# Patient Record
Sex: MALE | Race: WHITE | Employment: FULL TIME | URBAN - METROPOLITAN AREA
[De-identification: names, ages, dates, MRNs, and addresses within clinical notes are randomized per-mention and may not be internally consistent; named-entity substitution may affect disease eponyms.]

---

## 2017-05-04 RX ORDER — LOVASTATIN 40 MG/1
TABLET ORAL
Qty: 90 TABLET | Refills: 1 | Status: SHIPPED | OUTPATIENT
Start: 2017-05-04 | End: 2017-11-17

## 2017-09-15 ENCOUNTER — TELEPHONE (OUTPATIENT)
Dept: INTERNAL MEDICINE CLINIC | Facility: CLINIC | Age: 72
End: 2017-09-15

## 2017-09-15 DIAGNOSIS — Z13.0 SCREENING FOR DISORDER OF BLOOD AND BLOOD-FORMING ORGANS: ICD-10-CM

## 2017-09-15 DIAGNOSIS — Z13.228 SCREENING FOR METABOLIC DISORDER: ICD-10-CM

## 2017-09-15 DIAGNOSIS — Z13.29 SCREENING FOR THYROID DISORDER: ICD-10-CM

## 2017-09-15 DIAGNOSIS — Z00.00 ROUTINE GENERAL MEDICAL EXAMINATION AT A HEALTH CARE FACILITY: Primary | ICD-10-CM

## 2017-09-15 DIAGNOSIS — Z12.5 SCREENING FOR MALIGNANT NEOPLASM OF PROSTATE: ICD-10-CM

## 2017-09-15 DIAGNOSIS — Z13.220 SCREENING FOR LIPOID DISORDERS: ICD-10-CM

## 2017-09-15 NOTE — TELEPHONE ENCOUNTER
cpe Future Appointments  Date Time Provider Yulisa Barber   11/17/2017 9:00 AM Irene Alpers, MD [facility]     Or[facility] to [facility]  [facility] must fast no call back required

## 2017-11-17 ENCOUNTER — OFFICE VISIT (OUTPATIENT)
Dept: INTERNAL MEDICINE CLINIC | Facility: CLINIC | Age: 72
End: 2017-11-17

## 2017-11-17 VITALS
SYSTOLIC BLOOD PRESSURE: 134 MMHG | DIASTOLIC BLOOD PRESSURE: 60 MMHG | BODY MASS INDEX: 29.25 KG/M2 | TEMPERATURE: 98 F | WEIGHT: 193 LBS | HEIGHT: 68 IN | HEART RATE: 104 BPM

## 2017-11-17 DIAGNOSIS — K21.9 GASTROESOPHAGEAL REFLUX DISEASE, ESOPHAGITIS PRESENCE NOT SPECIFIED: ICD-10-CM

## 2017-11-17 DIAGNOSIS — Z00.00 ROUTINE GENERAL MEDICAL EXAMINATION AT A HEALTH CARE FACILITY: Primary | ICD-10-CM

## 2017-11-17 DIAGNOSIS — R94.31 ABNORMAL EKG: ICD-10-CM

## 2017-11-17 DIAGNOSIS — E78.5 DYSLIPIDEMIA: ICD-10-CM

## 2017-11-17 PROCEDURE — 99397 PER PM REEVAL EST PAT 65+ YR: CPT | Performed by: INTERNAL MEDICINE

## 2017-11-17 PROCEDURE — 93000 ELECTROCARDIOGRAM COMPLETE: CPT | Performed by: INTERNAL MEDICINE

## 2017-11-17 RX ORDER — LOVASTATIN 40 MG/1
40 TABLET ORAL NIGHTLY
COMMUNITY
End: 2017-11-17

## 2017-11-17 RX ORDER — LOVASTATIN 40 MG/1
40 TABLET ORAL NIGHTLY
Qty: 90 TABLET | Refills: 3 | Status: SHIPPED | OUTPATIENT
Start: 2017-11-17 | End: 2018-11-19

## 2017-11-17 NOTE — PROGRESS NOTES
Patient presents with:  Physical: Room 3.        HPI:    Patient here for cpe  Dyslipidemia- controlled, on lovastatin, no SE reported  Diet: watches  Exercise:  active at work  Work: [employer] full time still  cscope UTD, next due 2019, polyps found  Marissa Bullock hemorrhoids 2005   • Joint pain    • Ocular motility disorder    • Other and unspecified hyperlipidemia 6/29/2012   • Other vitreous opacities 2004   • Rash 2009   • Reflux 2001    Symptoms   • Routine general medical examination at a health care facility normal.  Nose normal. Oropharynx is clear and moist.   Eyes: Conjunctivae and EOM are normal. PERRLA. No scleral icterus. Neck: Normal range of motion. Neck supple. Normal carotid pulses and no JVD present. No edema present.  No mass and no thyromegaly pr

## 2017-12-04 ENCOUNTER — HOSPITAL ENCOUNTER (OUTPATIENT)
Dept: CV DIAGNOSTICS | Facility: HOSPITAL | Age: 72
Discharge: HOME | End: 2017-12-04
Attending: INTERNAL MEDICINE
Payer: COMMERCIAL

## 2017-12-04 DIAGNOSIS — R94.31 ABNORMAL EKG: ICD-10-CM

## 2017-12-04 DIAGNOSIS — E78.5 DYSLIPIDEMIA: ICD-10-CM

## 2017-12-04 PROCEDURE — 93350 STRESS TTE ONLY: CPT | Performed by: INTERNAL MEDICINE

## 2017-12-04 PROCEDURE — 93018 CV STRESS TEST I&R ONLY: CPT | Performed by: INTERNAL MEDICINE

## 2017-12-04 PROCEDURE — 93017 CV STRESS TEST TRACING ONLY: CPT | Performed by: INTERNAL MEDICINE

## 2017-12-08 RX ORDER — LOVASTATIN 40 MG/1
TABLET ORAL
Qty: 90 TABLET | Refills: 1 | Status: SHIPPED | OUTPATIENT
Start: 2017-12-08 | End: 2018-09-04

## 2018-09-04 RX ORDER — LOVASTATIN 40 MG/1
TABLET ORAL
Qty: 90 TABLET | Refills: 1 | Status: SHIPPED | OUTPATIENT
Start: 2018-09-04 | End: 2018-11-19

## 2018-09-05 ENCOUNTER — TELEPHONE (OUTPATIENT)
Dept: INTERNAL MEDICINE CLINIC | Facility: CLINIC | Age: 73
End: 2018-09-05

## 2018-09-05 DIAGNOSIS — Z12.5 SCREENING FOR MALIGNANT NEOPLASM OF PROSTATE: ICD-10-CM

## 2018-09-05 DIAGNOSIS — Z13.29 SCREENING FOR THYROID DISORDER: ICD-10-CM

## 2018-09-05 DIAGNOSIS — Z13.228 SCREENING FOR METABOLIC DISORDER: ICD-10-CM

## 2018-09-05 DIAGNOSIS — E78.5 DYSLIPIDEMIA: Primary | ICD-10-CM

## 2018-09-05 DIAGNOSIS — Z13.0 SCREENING FOR DISORDER OF BLOOD AND BLOOD-FORMING ORGANS: ICD-10-CM

## 2018-09-05 DIAGNOSIS — Z00.00 ROUTINE GENERAL MEDICAL EXAMINATION AT A HEALTH CARE FACILITY: ICD-10-CM

## 2018-09-05 NOTE — TELEPHONE ENCOUNTER
Lab orders for CPE Future Appointments  Date Time Provider Yulisa Barber   11/19/2018 9:30 AM Lashell Thompson MD [facility]     Pref[facility] lab is [facility].  Pt aware to fast

## 2018-09-05 NOTE — TELEPHONE ENCOUNTER
Pt scheduled CPE on [facility] with this message    Appointment For: Ivan Gary ([id number])   Visit Type: [address] [place], [place] [id number] 6 ([id number])      11/19/2018   9:30 AM  30 mins. Kan Vazquez MD        [facility] [address]      Patient Comments:   Annual Physical,

## 2018-09-05 NOTE — TELEPHONE ENCOUNTER
Patient informed continue taking medications as prescribed until OV. Routed to [facility] home for lab orders.

## 2018-09-14 ENCOUNTER — MED REC SCAN ONLY (OUTPATIENT)
Dept: INTERNAL MEDICINE CLINIC | Facility: CLINIC | Age: 73
End: 2018-09-14

## 2018-11-19 ENCOUNTER — OFFICE VISIT (OUTPATIENT)
Dept: INTERNAL MEDICINE CLINIC | Facility: CLINIC | Age: 73
End: 2018-11-19
Payer: COMMERCIAL

## 2018-11-19 VITALS
WEIGHT: 193.63 LBS | HEART RATE: 88 BPM | DIASTOLIC BLOOD PRESSURE: 72 MMHG | HEIGHT: 68 IN | BODY MASS INDEX: 29.35 KG/M2 | RESPIRATION RATE: 16 BRPM | SYSTOLIC BLOOD PRESSURE: 138 MMHG

## 2018-11-19 DIAGNOSIS — R94.6 BORDERLINE ABNORMAL TFTS: ICD-10-CM

## 2018-11-19 DIAGNOSIS — N40.0 BENIGN PROSTATIC HYPERPLASIA WITHOUT LOWER URINARY TRACT SYMPTOMS: ICD-10-CM

## 2018-11-19 DIAGNOSIS — E78.5 DYSLIPIDEMIA: ICD-10-CM

## 2018-11-19 DIAGNOSIS — Z00.00 ROUTINE GENERAL MEDICAL EXAMINATION AT A HEALTH CARE FACILITY: Primary | ICD-10-CM

## 2018-11-19 PROCEDURE — 99397 PER PM REEVAL EST PAT 65+ YR: CPT | Performed by: INTERNAL MEDICINE

## 2018-11-19 RX ORDER — LOVASTATIN 40 MG/1
40 TABLET ORAL NIGHTLY
Qty: 90 TABLET | Refills: 3 | Status: SHIPPED | OUTPATIENT
Start: 2018-11-19 | End: 2019-11-20

## 2018-11-19 NOTE — PROGRESS NOTES
Patient presents with:  Physical: TN Exam Room 3: CPE, labs completed with abnormal Free T4  Elbow Pain: Left elbow pain      HPI:    Patient here for cpe  Dyslipidemia- controlled on lovastatin, no SE reported  Work: [employer] full time still, very activ 2005   • Joint pain    • Ocular motility disorder    • Other and unspecified hyperlipidemia 6/29/2012   • Other vitreous opacities 2004   • Rash 2009   • Reflux 2001    Symptoms   • Routine general medical examination at a health care facility 2004   • [facility] atraumatic. Hearing and tympanic membranes normal.  Nose normal. Oropharynx is clear and moist.   Eyes: Conjunctivae and EOM are normal. PERRLA. No scleral icterus. Neck: Normal range of motion. Neck supple. Normal carotid pulses and no JVD present.  No e understands the plan.

## 2019-02-14 ENCOUNTER — TELEPHONE (OUTPATIENT)
Dept: INTERNAL MEDICINE CLINIC | Facility: CLINIC | Age: 74
End: 2019-02-14

## 2019-02-14 NOTE — TELEPHONE ENCOUNTER
Patient has orders for a TSH and T4 in for Miranda Teran. Please change order to [facility].  Patient is going to [facility].

## 2019-02-16 LAB
T4, FREE: 1.2 NG/DL (ref 0.8–1.8)
TSH: 3.13 MIU/L (ref 0.4–4.5)

## 2019-03-14 ENCOUNTER — OFFICE VISIT (OUTPATIENT)
Dept: INTERNAL MEDICINE CLINIC | Facility: CLINIC | Age: 74
End: 2019-03-14
Payer: COMMERCIAL

## 2019-03-14 VITALS
SYSTOLIC BLOOD PRESSURE: 140 MMHG | HEIGHT: 68 IN | BODY MASS INDEX: 28.64 KG/M2 | TEMPERATURE: 98 F | DIASTOLIC BLOOD PRESSURE: 70 MMHG | HEART RATE: 92 BPM | WEIGHT: 189 LBS

## 2019-03-14 DIAGNOSIS — R11.2 NON-INTRACTABLE VOMITING WITH NAUSEA, UNSPECIFIED VOMITING TYPE: ICD-10-CM

## 2019-03-14 DIAGNOSIS — A09 DIARRHEA OF INFECTIOUS ORIGIN: Primary | ICD-10-CM

## 2019-03-14 PROCEDURE — 99213 OFFICE O/P EST LOW 20 MIN: CPT | Performed by: INTERNAL MEDICINE

## 2019-03-14 NOTE — PROGRESS NOTES
Samantha Murphy is a 72year old male. Patient presents with:  Diarrhea: LG. Room 3. Diarrhea that started Monday. HPI:     C/o fatigue,  diarrhea, nausea and little bit of vomiting that started 4 days ago.  He was feeling LH while working at [employer] per week      Frequency: 2-3 times a week      Drinks per session: 1 or 2      Binge frequency: Never      Comment: Cage done 3-14-19    Drug use: No    Family History   Problem Relation Age of Onset   • Cancer Father         colon   • Colon Cancer Father

## 2019-03-20 PROBLEM — D12.5 BENIGN NEOPLASM OF SIGMOID COLON: Status: ACTIVE | Noted: 2019-03-20

## 2019-03-20 PROBLEM — Z86.0100 PERSONAL HISTORY OF COLONIC POLYPS: Status: ACTIVE | Noted: 2019-03-20

## 2019-03-20 PROBLEM — Z86.010 PERSONAL HISTORY OF COLONIC POLYPS: Status: ACTIVE | Noted: 2019-03-20

## 2019-03-20 PROBLEM — D12.3 BENIGN NEOPLASM OF TRANSVERSE COLON: Status: ACTIVE | Noted: 2019-03-20

## 2019-03-20 PROBLEM — Z80.0 FAMILY HISTORY OF MALIGNANT NEOPLASM OF DIGESTIVE ORGAN: Status: ACTIVE | Noted: 2019-03-20

## 2019-09-11 ENCOUNTER — TELEPHONE (OUTPATIENT)
Dept: INTERNAL MEDICINE CLINIC | Facility: CLINIC | Age: 74
End: 2019-09-11

## 2019-09-11 DIAGNOSIS — Z12.5 SCREENING FOR PROSTATE CANCER: ICD-10-CM

## 2019-09-11 DIAGNOSIS — Z00.00 ROUTINE GENERAL MEDICAL EXAMINATION AT A HEALTH CARE FACILITY: Primary | ICD-10-CM

## 2019-09-11 DIAGNOSIS — Z13.228 SCREENING FOR ENDOCRINE, METABOLIC AND IMMUNITY DISORDER: ICD-10-CM

## 2019-09-11 DIAGNOSIS — Z13.0 SCREENING FOR DISORDER OF BLOOD AND BLOOD-FORMING ORGANS: ICD-10-CM

## 2019-09-11 DIAGNOSIS — Z13.220 SCREENING FOR LIPID DISORDERS: ICD-10-CM

## 2019-09-11 DIAGNOSIS — Z13.29 SCREENING FOR THYROID DISORDER: ICD-10-CM

## 2019-09-11 DIAGNOSIS — Z13.29 SCREENING FOR ENDOCRINE, METABOLIC AND IMMUNITY DISORDER: ICD-10-CM

## 2019-09-11 DIAGNOSIS — Z13.0 SCREENING FOR ENDOCRINE, METABOLIC AND IMMUNITY DISORDER: ICD-10-CM

## 2019-09-11 NOTE — TELEPHONE ENCOUNTER
Future Appointments   Date Time Provider Yulisa Barber   11/20/2019  9:00 AM Marco Carlton MD [facility]     Patient is scheduled for Annual Physical.  Please place orders with [facility].  Patient will be fasting.

## 2019-11-04 RX ORDER — LOVASTATIN 40 MG/1
TABLET ORAL
Qty: 90 TABLET | Refills: 0 | Status: SHIPPED | OUTPATIENT
Start: 2019-11-04 | End: 2019-11-20

## 2019-11-05 NOTE — TELEPHONE ENCOUNTER
Future Appointments   Date Time Provider Yulisa Elisabeth   11/20/2019  9:00 AM Odilon Metz MD [facility]

## 2019-11-20 ENCOUNTER — OFFICE VISIT (OUTPATIENT)
Dept: INTERNAL MEDICINE CLINIC | Facility: CLINIC | Age: 74
End: 2019-11-20
Payer: COMMERCIAL

## 2019-11-20 VITALS
HEIGHT: 67.52 IN | DIASTOLIC BLOOD PRESSURE: 68 MMHG | SYSTOLIC BLOOD PRESSURE: 122 MMHG | RESPIRATION RATE: 16 BRPM | BODY MASS INDEX: 29.59 KG/M2 | TEMPERATURE: 98 F | WEIGHT: 193 LBS | HEART RATE: 96 BPM

## 2019-11-20 DIAGNOSIS — Z00.00 ROUTINE GENERAL MEDICAL EXAMINATION AT A HEALTH CARE FACILITY: Primary | ICD-10-CM

## 2019-11-20 DIAGNOSIS — R04.0 FREQUENT NOSEBLEEDS: ICD-10-CM

## 2019-11-20 DIAGNOSIS — E78.5 DYSLIPIDEMIA: ICD-10-CM

## 2019-11-20 DIAGNOSIS — Z86.010 PERSONAL HISTORY OF COLONIC POLYPS: ICD-10-CM

## 2019-11-20 PROCEDURE — 93000 ELECTROCARDIOGRAM COMPLETE: CPT | Performed by: INTERNAL MEDICINE

## 2019-11-20 PROCEDURE — 99397 PER PM REEVAL EST PAT 65+ YR: CPT | Performed by: INTERNAL MEDICINE

## 2019-11-20 RX ORDER — LOVASTATIN 40 MG/1
40 TABLET ORAL NIGHTLY
Qty: 90 TABLET | Refills: 3 | Status: SHIPPED | OUTPATIENT
Start: 2019-11-20 | End: 2020-11-23

## 2019-11-20 NOTE — PROGRESS NOTES
Patient presents with:  Wellness Visit:  Rm 4      HPI:    Patient here for cpe  Cataract in left eye, glaucoma in both and also retina issue recently, going to [facility]  12/5/19 left eye surgery planned by retina specialist  Jacquelin- edilberto 1/3/2012    Hypercholesterolemia   • Glaucoma 3 years   • Hemorrhage of eyelid 2004    \"right eye bleed\"   • Herpes zoster without mention of complication 83/25/9993   • High cholesterol     controlled by lovastain   • Internal hemorrhoids 2005   • Joint Pneumococcal (Prevnar 13)                          11/16/2015 09/11/2019      Pneumovax 23          10/15/2012      TDAP                  11/16/2015    Dental Visits:  Colonoscopy:  Pap:  Mammogram:  Bone Density:  HCV Screening:    Physical Exam  BP recently  Mild renal insufficiency - push fluids, avoid NSAIDS  Personal history of colonic polyps - he is utd on cscope  Dyslipidemia- controlled, CPM        Meds & Refills for this Visit:  Requested Prescriptions     Signed Prescriptions Disp Refills   •

## 2020-06-13 ENCOUNTER — APPOINTMENT (OUTPATIENT)
Dept: GENERAL RADIOLOGY | Facility: HOSPITAL | Age: 75
End: 2020-06-13
Attending: EMERGENCY MEDICINE
Payer: COMMERCIAL

## 2020-06-13 ENCOUNTER — HOSPITAL ENCOUNTER (EMERGENCY)
Facility: HOSPITAL | Age: 75
Discharge: HOME | End: 2020-06-13
Attending: EMERGENCY MEDICINE
Payer: COMMERCIAL

## 2020-06-13 VITALS
HEIGHT: 68 IN | HEART RATE: 89 BPM | OXYGEN SATURATION: 99 % | WEIGHT: 190 LBS | BODY MASS INDEX: 28.79 KG/M2 | TEMPERATURE: 98 F | RESPIRATION RATE: 16 BRPM | SYSTOLIC BLOOD PRESSURE: 144 MMHG | DIASTOLIC BLOOD PRESSURE: 72 MMHG

## 2020-06-13 DIAGNOSIS — S90.122A CONTUSION OF LESSER TOE OF LEFT FOOT WITHOUT DAMAGE TO NAIL, INITIAL ENCOUNTER: Primary | ICD-10-CM

## 2020-06-13 PROCEDURE — 99283 EMERGENCY DEPT VISIT LOW MDM: CPT

## 2020-06-13 PROCEDURE — 10060 I&D ABSCESS SIMPLE/SINGLE: CPT

## 2020-06-13 PROCEDURE — 73630 X-RAY EXAM OF FOOT: CPT | Performed by: EMERGENCY MEDICINE

## 2020-06-13 RX ORDER — CEPHALEXIN 500 MG/1
500 CAPSULE ORAL 4 TIMES DAILY
Qty: 40 CAPSULE | Refills: 0 | Status: SHIPPED | OUTPATIENT
Start: 2020-06-13 | End: 2020-06-23 | Stop reason: ALTCHOICE

## 2020-06-13 NOTE — ED PROVIDER NOTES
Patient Seen in: [facility] Emergency Department      History   Patient presents with:  Lower Extremity Injury    Stated Complaint: lower ext    HPI    Cristal Caldwell is a pleasant 70-year-old gentleman presenting with left third toe pain.   Patient states h 144/72   Pulse 89   Temp 98.1 °F (36.7 °C) (Temporal)   Resp 16   Ht 172.7 cm (5' 8\")   Wt 86.2 kg   SpO2 99%   BMI 28.89 kg/m²         Physical Exam  Alert and oriented patient appears no distress left lower extremity exam blood blister present near the

## 2020-06-13 NOTE — ED INITIAL ASSESSMENT (HPI)
Pt arrives with toe injury to left foot. Pt states he had a portable radio land on it yesterday afternoon. Pt didn't think much of it and went to work. After work noticed bruising. This morning no change.

## 2020-06-23 ENCOUNTER — OFFICE VISIT (OUTPATIENT)
Dept: INTERNAL MEDICINE CLINIC | Facility: CLINIC | Age: 75
End: 2020-06-23
Payer: COMMERCIAL

## 2020-06-23 VITALS
DIASTOLIC BLOOD PRESSURE: 70 MMHG | WEIGHT: 188 LBS | HEART RATE: 88 BPM | HEIGHT: 68 IN | SYSTOLIC BLOOD PRESSURE: 112 MMHG | TEMPERATURE: 98 F | BODY MASS INDEX: 28.49 KG/M2 | RESPIRATION RATE: 16 BRPM

## 2020-06-23 DIAGNOSIS — S90.122D CONTUSION OF LESSER TOE OF LEFT FOOT WITHOUT DAMAGE TO NAIL, SUBSEQUENT ENCOUNTER: Primary | ICD-10-CM

## 2020-06-23 PROCEDURE — 99213 OFFICE O/P EST LOW 20 MIN: CPT | Performed by: INTERNAL MEDICINE

## 2020-06-23 NOTE — PROGRESS NOTES
Jacob Virginia  3/29/1945    Patient presents with:  ER F/U: SN Rm 6; ER 6/13, \"mid toe L foot\", hematoma/contusion      Henrietta Valentine is a 70year old male who presents with as an ED follow-up.     The patient was in his usual state of heal at a health care facility 2004   • Tinea 2001    infection   • Wears glasses many years    wear glasses      Patient Active Problem List:     Other and unspecified hyperlipidemia     Dyslipidemia     Internal hemorrhoids     Benign prostatic hyperplasia AND PLAN:   Lester Booker is a 70year old male who presents as an ED follow-up.     Left third metatarsal contusion:  Plain film ruled out acute fracture  Improving swelling, bruising, and pain  Skin is clean, dry, and intact without evidence of infection

## 2020-09-12 ENCOUNTER — TELEPHONE (OUTPATIENT)
Dept: INTERNAL MEDICINE CLINIC | Facility: CLINIC | Age: 75
End: 2020-09-12

## 2020-09-12 DIAGNOSIS — Z00.00 ROUTINE GENERAL MEDICAL EXAMINATION AT A HEALTH CARE FACILITY: Primary | ICD-10-CM

## 2020-09-12 DIAGNOSIS — Z13.29 SCREENING FOR THYROID DISORDER: ICD-10-CM

## 2020-09-12 DIAGNOSIS — Z13.228 SCREENING FOR METABOLIC DISORDER: ICD-10-CM

## 2020-09-12 DIAGNOSIS — Z13.220 SCREENING FOR LIPID DISORDERS: ICD-10-CM

## 2020-09-12 DIAGNOSIS — Z13.0 SCREENING FOR BLOOD DISEASE: ICD-10-CM

## 2020-11-23 ENCOUNTER — OFFICE VISIT (OUTPATIENT)
Dept: INTERNAL MEDICINE CLINIC | Facility: CLINIC | Age: 75
End: 2020-11-23
Payer: COMMERCIAL

## 2020-11-23 VITALS
BODY MASS INDEX: 29.13 KG/M2 | TEMPERATURE: 97 F | RESPIRATION RATE: 16 BRPM | DIASTOLIC BLOOD PRESSURE: 78 MMHG | SYSTOLIC BLOOD PRESSURE: 120 MMHG | HEART RATE: 76 BPM | WEIGHT: 190 LBS | HEIGHT: 67.72 IN

## 2020-11-23 DIAGNOSIS — Z12.83 SCREENING FOR SKIN CANCER: ICD-10-CM

## 2020-11-23 DIAGNOSIS — Z00.00 ROUTINE GENERAL MEDICAL EXAMINATION AT A HEALTH CARE FACILITY: Primary | ICD-10-CM

## 2020-11-23 DIAGNOSIS — R35.1 BPH ASSOCIATED WITH NOCTURIA: ICD-10-CM

## 2020-11-23 DIAGNOSIS — Z12.5 ENCOUNTER FOR SPECIAL SCREENING EXAMINATION FOR NEOPLASM OF PROSTATE: ICD-10-CM

## 2020-11-23 DIAGNOSIS — E78.5 DYSLIPIDEMIA: ICD-10-CM

## 2020-11-23 DIAGNOSIS — R94.6 BORDERLINE ABNORMAL TFTS: ICD-10-CM

## 2020-11-23 DIAGNOSIS — L98.9 FACIAL SKIN LESION: ICD-10-CM

## 2020-11-23 DIAGNOSIS — N40.1 BPH ASSOCIATED WITH NOCTURIA: ICD-10-CM

## 2020-11-23 PROCEDURE — 3008F BODY MASS INDEX DOCD: CPT | Performed by: INTERNAL MEDICINE

## 2020-11-23 PROCEDURE — 99397 PER PM REEVAL EST PAT 65+ YR: CPT | Performed by: INTERNAL MEDICINE

## 2020-11-23 PROCEDURE — 3078F DIAST BP <80 MM HG: CPT | Performed by: INTERNAL MEDICINE

## 2020-11-23 PROCEDURE — 3074F SYST BP LT 130 MM HG: CPT | Performed by: INTERNAL MEDICINE

## 2020-11-23 RX ORDER — LOVASTATIN 40 MG/1
40 TABLET ORAL NIGHTLY
Qty: 90 TABLET | Refills: 3 | Status: SHIPPED | OUTPATIENT
Start: 2020-11-23 | End: 2020-12-09

## 2020-11-23 NOTE — PROGRESS NOTES
Patient presents with:  Physical: SN Rm 3      HPI:    Patient here for CPE  Doing well, mostly had visits at [facility] for cataracts this year  Dyslipidemia- lipids look great on lovastatin, no SE reported  BPH- occasional nocturia and urgency, d disorder    • Other and unspecified hyperlipidemia 6/29/2012   • Other vitreous opacities 2004   • Rash 2009   • Reflux 2001    Symptoms   • Routine general medical examination at a health care facility 2004   • Tinea 2001    infection   • Wears glasses ma 11/16/2015 09/11/2019      Pneumovax 23          10/15/2012      TDAP                  11/16/2015      Zoster Vaccine Recombinant Adjuvanted (Shingrix)                          01/05/2020       Physical Exam  /78 (BP Location: Left arm, Patient Posi FREE [866] [Q]      PSA, TOTAL W REFLEX TO PSA, FREE [99875][Q]      Meds & Refills for this Visit:  Requested Prescriptions     Signed Prescriptions Disp Refills   • Lovastatin 40 MG Oral Tab 90 tablet 3     Sig: Take 1 tablet (40 mg total) by mouth night

## 2020-12-09 DIAGNOSIS — E78.5 DYSLIPIDEMIA: ICD-10-CM

## 2020-12-10 RX ORDER — LOVASTATIN 40 MG/1
40 TABLET ORAL NIGHTLY
Qty: 90 TABLET | Refills: 3 | Status: SHIPPED | OUTPATIENT
Start: 2020-12-10 | End: 2021-11-22

## 2020-12-10 NOTE — TELEPHONE ENCOUNTER
LOV:11/23/20, CPE, TB  Last CPE:11/23/20, CPE, TB  Last refill:Lovastatin 36 MG Oral Tab,11/23/20  Quantity:90 tablet, 3 refills  Last labs that are related: cbc 11/13/20  Future appointment:No future appointments.   Protocol: passed protocol, approve

## 2021-03-17 ENCOUNTER — IMMUNIZATION (OUTPATIENT)
Dept: LAB | Facility: HOSPITAL | Age: 76
End: 2021-03-17
Attending: HOSPITALIST
Payer: COMMERCIAL

## 2021-03-17 DIAGNOSIS — Z23 NEED FOR VACCINATION: Primary | ICD-10-CM

## 2021-03-17 PROCEDURE — 0011A SARSCOV2 VAC 100MCG/0.5ML IM: CPT

## 2021-03-25 LAB — TOTAL PSA: 1.4 NG/ML

## 2021-04-14 ENCOUNTER — IMMUNIZATION (OUTPATIENT)
Dept: LAB | Facility: HOSPITAL | Age: 76
End: 2021-04-14
Attending: EMERGENCY MEDICINE
Payer: COMMERCIAL

## 2021-04-14 DIAGNOSIS — Z23 NEED FOR VACCINATION: Primary | ICD-10-CM

## 2021-04-14 PROCEDURE — 0012A SARSCOV2 VAC 100MCG/0.5ML IM: CPT

## 2021-10-06 ENCOUNTER — TELEPHONE (OUTPATIENT)
Dept: INTERNAL MEDICINE CLINIC | Facility: CLINIC | Age: 76
End: 2021-10-06

## 2021-10-06 DIAGNOSIS — Z13.0 SCREENING FOR BLOOD DISEASE: ICD-10-CM

## 2021-10-06 DIAGNOSIS — Z13.220 SCREENING FOR LIPID DISORDERS: ICD-10-CM

## 2021-10-06 DIAGNOSIS — Z13.228 SCREENING FOR METABOLIC DISORDER: ICD-10-CM

## 2021-10-06 DIAGNOSIS — Z00.00 ROUTINE GENERAL MEDICAL EXAMINATION AT A HEALTH CARE FACILITY: Primary | ICD-10-CM

## 2021-10-06 DIAGNOSIS — Z13.29 SCREENING FOR THYROID DISORDER: ICD-10-CM

## 2021-10-06 NOTE — TELEPHONE ENCOUNTER
Future Appointments   Date Time Provider Yulisa Chewi   12/9/2021  1:00 PM Kenton Melissa MD [facility]     Orders to  THE [facility]   aware must fast no call back required

## 2021-11-22 DIAGNOSIS — E78.5 DYSLIPIDEMIA: ICD-10-CM

## 2021-11-22 RX ORDER — LOVASTATIN 40 MG/1
TABLET ORAL
Qty: 90 TABLET | Refills: 0 | Status: SHIPPED | OUTPATIENT
Start: 2021-11-22

## 2021-11-23 ENCOUNTER — TELEPHONE (OUTPATIENT)
Dept: INTERNAL MEDICINE CLINIC | Facility: CLINIC | Age: 76
End: 2021-11-23

## 2021-11-23 NOTE — TELEPHONE ENCOUNTER
We put orders to [facility] and [facility] re [facility] to [facility]-patient is going on Monday 11/29

## 2021-12-09 ENCOUNTER — OFFICE VISIT (OUTPATIENT)
Dept: INTERNAL MEDICINE CLINIC | Facility: CLINIC | Age: 76
End: 2021-12-09
Payer: COMMERCIAL

## 2021-12-09 VITALS
HEIGHT: 67 IN | TEMPERATURE: 98 F | WEIGHT: 185.81 LBS | DIASTOLIC BLOOD PRESSURE: 66 MMHG | RESPIRATION RATE: 18 BRPM | OXYGEN SATURATION: 98 % | SYSTOLIC BLOOD PRESSURE: 128 MMHG | HEART RATE: 74 BPM | BODY MASS INDEX: 29.16 KG/M2

## 2021-12-09 DIAGNOSIS — N40.1 BENIGN PROSTATIC HYPERPLASIA WITH NOCTURIA: ICD-10-CM

## 2021-12-09 DIAGNOSIS — Z00.00 ROUTINE GENERAL MEDICAL EXAMINATION AT A HEALTH CARE FACILITY: Primary | ICD-10-CM

## 2021-12-09 DIAGNOSIS — R04.0 BLEEDING FROM THE NOSE: ICD-10-CM

## 2021-12-09 DIAGNOSIS — R73.9 BLOOD GLUCOSE ELEVATED: ICD-10-CM

## 2021-12-09 DIAGNOSIS — Z12.5 ENCOUNTER FOR SPECIAL SCREENING EXAMINATION FOR NEOPLASM OF PROSTATE: ICD-10-CM

## 2021-12-09 DIAGNOSIS — E78.5 DYSLIPIDEMIA: ICD-10-CM

## 2021-12-09 DIAGNOSIS — R35.1 BENIGN PROSTATIC HYPERPLASIA WITH NOCTURIA: ICD-10-CM

## 2021-12-09 PROCEDURE — 3078F DIAST BP <80 MM HG: CPT | Performed by: INTERNAL MEDICINE

## 2021-12-09 PROCEDURE — 3074F SYST BP LT 130 MM HG: CPT | Performed by: INTERNAL MEDICINE

## 2021-12-09 PROCEDURE — 3008F BODY MASS INDEX DOCD: CPT | Performed by: INTERNAL MEDICINE

## 2021-12-09 PROCEDURE — 99397 PER PM REEVAL EST PAT 65+ YR: CPT | Performed by: INTERNAL MEDICINE

## 2021-12-09 RX ORDER — SODIUM FLUORIDE 6 MG/ML
100 PASTE, DENTIFRICE DENTAL AS NEEDED
COMMUNITY
Start: 2021-11-12

## 2021-12-09 NOTE — PROGRESS NOTES
Patient presents with:  Physical: ES rm - 3 Physical      HPI:    Patient here for CPE. Working full time at [employer], walks many miles at work. Feels good except for recurrent nose bleeds from the right nostril.  Happening frequently over the year  Dysl 12/23/2009   • High cholesterol     controlled by lovastain   • Internal hemorrhoids 2005   • Joint pain    • Ocular motility disorder    • Other and unspecified hyperlipidemia 6/29/2012   • Other vitreous opacities 2004   • Rash 2009   • Reflux 2001    Sy ()                          09/10/2017  09/11/2018  09/11/2019      Influenza             10/01/2009  10/24/2009  10/21/2013                            10/30/2014  10/22/2015  09/11/2017                            09/08/2020      Pneumococcal (Prevnar this year but will need it next year.  Encouraged heart healthy diet and regular exercise  Dyslipidemia- controlled, CPM  Bleeding from the nose- referred to ent, humidifier in bedroom  Blood glucose elevated- watch diet, check [id number]  Encounter for special

## 2021-12-21 ENCOUNTER — IMMUNIZATION (OUTPATIENT)
Dept: LAB | Facility: HOSPITAL | Age: 76
End: 2021-12-21
Attending: EMERGENCY MEDICINE
Payer: COMMERCIAL

## 2021-12-21 DIAGNOSIS — Z23 NEED FOR VACCINATION: Primary | ICD-10-CM

## 2021-12-21 PROCEDURE — 0064A SARSCOV2 VAC 50MCG/0.25ML IM: CPT

## 2022-02-21 RX ORDER — LOVASTATIN 40 MG/1
TABLET ORAL
Qty: 90 TABLET | Refills: 0 | Status: SHIPPED | OUTPATIENT
Start: 2022-02-21

## 2022-02-21 NOTE — TELEPHONE ENCOUNTER
Last VISIT - 12/9/21 Physical    Last CPE - 12/9/21    Last REFILL -     LOVASTATIN 40 MG Oral Tab 90 tablet 0 11/22/2021     Last LABS - 11/30/21 tsh, lipid, cmp, cbc      Per PROTOCOL? PASSED    Please Approve or Deny.

## 2022-04-10 ENCOUNTER — LAB ENCOUNTER (OUTPATIENT)
Dept: LAB | Facility: HOSPITAL | Age: 77
End: 2022-04-10
Attending: INTERNAL MEDICINE
Payer: COMMERCIAL

## 2022-04-10 DIAGNOSIS — Z01.818 PRE-OP TESTING: ICD-10-CM

## 2022-04-11 LAB — SARS-COV-2 RNA RESP QL NAA+PROBE: NOT DETECTED

## 2022-04-13 PROBLEM — Z80.0 FAMILY HISTORY OF COLON CANCER: Status: ACTIVE | Noted: 2022-04-13

## 2022-04-13 PROBLEM — Z86.0101 HISTORY OF ADENOMATOUS POLYP OF COLON: Status: ACTIVE | Noted: 2022-04-13

## 2022-04-13 PROBLEM — Z86.010 HISTORY OF ADENOMATOUS POLYP OF COLON: Status: ACTIVE | Noted: 2022-04-13

## 2022-05-14 RX ORDER — LOVASTATIN 40 MG/1
TABLET ORAL
Qty: 90 TABLET | Refills: 0 | Status: SHIPPED | OUTPATIENT
Start: 2022-05-14

## 2022-05-26 ENCOUNTER — OFFICE VISIT (OUTPATIENT)
Dept: FAMILY MEDICINE CLINIC | Facility: CLINIC | Age: 77
End: 2022-05-26
Payer: COMMERCIAL

## 2022-05-26 VITALS
DIASTOLIC BLOOD PRESSURE: 79 MMHG | RESPIRATION RATE: 18 BRPM | BODY MASS INDEX: 28.49 KG/M2 | SYSTOLIC BLOOD PRESSURE: 141 MMHG | TEMPERATURE: 99 F | WEIGHT: 188 LBS | HEIGHT: 68 IN | HEART RATE: 105 BPM | OXYGEN SATURATION: 97 %

## 2022-05-26 DIAGNOSIS — R04.0 RIGHT-SIDED EPISTAXIS: Primary | ICD-10-CM

## 2022-05-26 DIAGNOSIS — Z87.898 HX OF EPISTAXIS: ICD-10-CM

## 2022-05-26 PROCEDURE — 99213 OFFICE O/P EST LOW 20 MIN: CPT | Performed by: NURSE PRACTITIONER

## 2022-05-26 PROCEDURE — 3008F BODY MASS INDEX DOCD: CPT | Performed by: NURSE PRACTITIONER

## 2022-05-26 PROCEDURE — 3078F DIAST BP <80 MM HG: CPT | Performed by: NURSE PRACTITIONER

## 2022-05-26 PROCEDURE — 3077F SYST BP >= 140 MM HG: CPT | Performed by: NURSE PRACTITIONER

## 2022-08-05 ENCOUNTER — TELEPHONE (OUTPATIENT)
Dept: INTERNAL MEDICINE CLINIC | Facility: CLINIC | Age: 77
End: 2022-08-05

## 2022-08-05 DIAGNOSIS — Z13.228 SCREENING FOR METABOLIC DISORDER: ICD-10-CM

## 2022-08-05 DIAGNOSIS — Z00.00 ROUTINE GENERAL MEDICAL EXAMINATION AT A HEALTH CARE FACILITY: Primary | ICD-10-CM

## 2022-08-05 DIAGNOSIS — Z13.0 SCREENING FOR BLOOD DISEASE: ICD-10-CM

## 2022-08-05 DIAGNOSIS — Z12.5 SCREENING FOR MALIGNANT NEOPLASM OF PROSTATE: ICD-10-CM

## 2022-08-05 DIAGNOSIS — Z13.220 SCREENING FOR LIPID DISORDERS: ICD-10-CM

## 2022-08-05 DIAGNOSIS — E78.5 DYSLIPIDEMIA: ICD-10-CM

## 2022-08-05 DIAGNOSIS — Z13.29 SCREENING FOR THYROID DISORDER: ICD-10-CM

## 2022-08-05 RX ORDER — LOVASTATIN 40 MG/1
TABLET ORAL
Qty: 90 TABLET | Refills: 0 | Status: SHIPPED | OUTPATIENT
Start: 2022-08-05

## 2022-08-05 NOTE — TELEPHONE ENCOUNTER
Annual Physical   Future Appointments   Date Time Provider Yulisa Elisabeth   12/13/2022  8:40 AM Keira Mart MD [facility]       Lab is [facility]   Pt aware to fast and to complete labs no sooner than 2 weeks prior to physical   No call back required

## 2022-11-10 DIAGNOSIS — E78.5 DYSLIPIDEMIA: ICD-10-CM

## 2022-11-10 PROBLEM — H25.13 AGE-RELATED NUCLEAR CATARACT OF BOTH EYES: Status: ACTIVE | Noted: 2019-10-29

## 2022-11-10 RX ORDER — LOVASTATIN 40 MG/1
TABLET ORAL
Qty: 90 TABLET | Refills: 0 | Status: SHIPPED | OUTPATIENT
Start: 2022-11-10

## 2022-11-10 NOTE — TELEPHONE ENCOUNTER
ALT < 80    ALT resulted within past year    Lipid panel within past 12 months    Appointment within past 12 or next 3 months     LOV 12/9/21    LAST LAB 11/30/21     LAST RX 8/5/22 90 tabs     Next OV   Future Appointments   Date Time Provider Yulisa Barber   12/13/2022  8:40 AM Rodri Uamnzor MD [facility]         PROTOCOL pass

## 2022-12-01 LAB
ABSOLUTE BASOPHILS: 32 CELLS/UL (ref 0–200)
ABSOLUTE EOSINOPHILS: 149 CELLS/UL (ref 15–500)
ABSOLUTE LYMPHOCYTES: 851 CELLS/UL (ref 850–3900)
ABSOLUTE MONOCYTES: 621 CELLS/UL (ref 200–950)
ABSOLUTE NEUTROPHILS: 2849 CELLS/UL (ref 1500–7800)
ALBUMIN/GLOBULIN RATIO: 1.3 (CALC) (ref 1–2.5)
ALBUMIN: 3.9 G/DL (ref 3.6–5.1)
ALKALINE PHOSPHATASE: 73 U/L (ref 35–144)
ALT: 18 U/L (ref 9–46)
AST: 20 U/L (ref 10–35)
BASOPHILS: 0.7 %
BILIRUBIN, TOTAL: 0.8 MG/DL (ref 0.2–1.2)
BUN: 19 MG/DL (ref 7–25)
CALCIUM: 9.1 MG/DL (ref 8.6–10.3)
CARBON DIOXIDE: 25 MMOL/L (ref 20–32)
CHLORIDE: 104 MMOL/L (ref 98–110)
CHOL/HDLC RATIO: 2.2 (CALC)
CHOLESTEROL, TOTAL: 123 MG/DL
CREATININE: 1.11 MG/DL (ref 0.7–1.28)
EGFR: 69 ML/MIN/1.73M2
EOSINOPHILS: 3.3 %
GLOBULIN: 3 G/DL (CALC) (ref 1.9–3.7)
GLUCOSE: 98 MG/DL (ref 65–99)
HDL CHOLESTEROL: 55 MG/DL
HEMATOCRIT: 42 % (ref 38.5–50)
HEMOGLOBIN: 13.9 G/DL (ref 13.2–17.1)
LDL-CHOLESTEROL: 55 MG/DL (CALC)
LYMPHOCYTES: 18.9 %
MCH: 30 PG (ref 27–33)
MCHC: 33.1 G/DL (ref 32–36)
MCV: 90.7 FL (ref 80–100)
MONOCYTES: 13.8 %
MPV: 10.2 FL (ref 7.5–12.5)
NEUTROPHILS: 63.3 %
NON-HDL CHOLESTEROL: 68 MG/DL (CALC)
PLATELET COUNT: 174 THOUSAND/UL (ref 140–400)
POTASSIUM: 3.8 MMOL/L (ref 3.5–5.3)
PROTEIN, TOTAL: 6.9 G/DL (ref 6.1–8.1)
RDW: 13.1 % (ref 11–15)
RED BLOOD CELL COUNT: 4.63 MILLION/UL (ref 4.2–5.8)
SODIUM: 138 MMOL/L (ref 135–146)
TOTAL PSA: 1.1 NG/ML
TRIGLYCERIDES: 54 MG/DL
TSH W/REFLEX TO FT4: 3.35 MIU/L (ref 0.4–4.5)
WHITE BLOOD CELL COUNT: 4.5 THOUSAND/UL (ref 3.8–10.8)

## 2022-12-13 ENCOUNTER — OFFICE VISIT (OUTPATIENT)
Dept: INTERNAL MEDICINE CLINIC | Facility: CLINIC | Age: 77
End: 2022-12-13
Payer: COMMERCIAL

## 2022-12-13 VITALS
HEART RATE: 92 BPM | DIASTOLIC BLOOD PRESSURE: 64 MMHG | BODY MASS INDEX: 27.89 KG/M2 | HEIGHT: 68 IN | WEIGHT: 184 LBS | TEMPERATURE: 98 F | SYSTOLIC BLOOD PRESSURE: 108 MMHG

## 2022-12-13 DIAGNOSIS — N40.1 BENIGN PROSTATIC HYPERPLASIA WITH NOCTURIA: ICD-10-CM

## 2022-12-13 DIAGNOSIS — E78.5 DYSLIPIDEMIA: ICD-10-CM

## 2022-12-13 DIAGNOSIS — R35.1 BENIGN PROSTATIC HYPERPLASIA WITH NOCTURIA: ICD-10-CM

## 2022-12-13 DIAGNOSIS — Z00.00 ROUTINE GENERAL MEDICAL EXAMINATION AT A HEALTH CARE FACILITY: Primary | ICD-10-CM

## 2022-12-13 PROCEDURE — 3074F SYST BP LT 130 MM HG: CPT | Performed by: INTERNAL MEDICINE

## 2022-12-13 PROCEDURE — 99397 PER PM REEVAL EST PAT 65+ YR: CPT | Performed by: INTERNAL MEDICINE

## 2022-12-13 PROCEDURE — 3008F BODY MASS INDEX DOCD: CPT | Performed by: INTERNAL MEDICINE

## 2022-12-13 PROCEDURE — 3078F DIAST BP <80 MM HG: CPT | Performed by: INTERNAL MEDICINE

## 2023-02-08 DIAGNOSIS — E78.5 DYSLIPIDEMIA: ICD-10-CM

## 2023-02-08 RX ORDER — LOVASTATIN 40 MG/1
TABLET ORAL
Qty: 90 TABLET | Refills: 0 | Status: SHIPPED | OUTPATIENT
Start: 2023-02-08

## 2023-05-05 ENCOUNTER — OFFICE VISIT (OUTPATIENT)
Dept: FAMILY MEDICINE CLINIC | Facility: CLINIC | Age: 78
End: 2023-05-05
Payer: COMMERCIAL

## 2023-05-05 ENCOUNTER — HOSPITAL ENCOUNTER (EMERGENCY)
Age: 78
Discharge: HOME | End: 2023-05-05
Attending: EMERGENCY MEDICINE

## 2023-05-05 VITALS
DIASTOLIC BLOOD PRESSURE: 84 MMHG | OXYGEN SATURATION: 97 % | SYSTOLIC BLOOD PRESSURE: 136 MMHG | HEART RATE: 110 BPM | BODY MASS INDEX: 28 KG/M2 | TEMPERATURE: 98 F | RESPIRATION RATE: 16 BRPM | WEIGHT: 185 LBS

## 2023-05-05 VITALS
TEMPERATURE: 98.2 F | RESPIRATION RATE: 16 BRPM | DIASTOLIC BLOOD PRESSURE: 89 MMHG | SYSTOLIC BLOOD PRESSURE: 147 MMHG | HEART RATE: 93 BPM | OXYGEN SATURATION: 98 %

## 2023-05-05 DIAGNOSIS — R04.0 EPISTAXIS: Primary | ICD-10-CM

## 2023-05-05 DIAGNOSIS — R04.0 BLEEDING FROM THE NOSE: Primary | ICD-10-CM

## 2023-05-05 PROCEDURE — 3075F SYST BP GE 130 - 139MM HG: CPT | Performed by: PHYSICIAN ASSISTANT

## 2023-05-05 PROCEDURE — 99283 EMERGENCY DEPT VISIT LOW MDM: CPT

## 2023-05-05 PROCEDURE — 99213 OFFICE O/P EST LOW 20 MIN: CPT | Performed by: PHYSICIAN ASSISTANT

## 2023-05-05 PROCEDURE — 3079F DIAST BP 80-89 MM HG: CPT | Performed by: PHYSICIAN ASSISTANT

## 2023-05-06 ENCOUNTER — HOSPITAL ENCOUNTER (EMERGENCY)
Age: 78
Discharge: HOME | End: 2023-05-06
Attending: EMERGENCY MEDICINE

## 2023-05-06 VITALS
TEMPERATURE: 97.6 F | DIASTOLIC BLOOD PRESSURE: 78 MMHG | HEART RATE: 90 BPM | SYSTOLIC BLOOD PRESSURE: 137 MMHG | OXYGEN SATURATION: 98 % | RESPIRATION RATE: 17 BRPM

## 2023-05-06 DIAGNOSIS — R04.0 EPISTAXIS: Primary | ICD-10-CM

## 2023-05-06 PROCEDURE — 99282 EMERGENCY DEPT VISIT SF MDM: CPT

## 2023-05-06 PROCEDURE — 10002803 HB RX 637: Performed by: EMERGENCY MEDICINE

## 2023-05-06 PROCEDURE — 30901 CONTROL OF NOSEBLEED: CPT

## 2023-05-06 PROCEDURE — 30901 CONTROL OF NOSEBLEED: CPT | Performed by: EMERGENCY MEDICINE

## 2023-05-06 RX ORDER — OXYMETAZOLINE HYDROCHLORIDE 0.05 G/100ML
2 SPRAY NASAL ONCE
Status: DISCONTINUED | OUTPATIENT
Start: 2023-05-06 | End: 2023-05-06 | Stop reason: HOSPADM

## 2023-05-06 ASSESSMENT — ENCOUNTER SYMPTOMS
SHORTNESS OF BREATH: 0
CHILLS: 0
DIARRHEA: 0
VOMITING: 0
LIGHT-HEADEDNESS: 0
NAUSEA: 0
DIZZINESS: 0
FEVER: 0
ABDOMINAL PAIN: 0

## 2023-05-09 DIAGNOSIS — E78.5 DYSLIPIDEMIA: ICD-10-CM

## 2023-05-10 RX ORDER — LOVASTATIN 40 MG/1
TABLET ORAL
Qty: 90 TABLET | Refills: 0 | Status: SHIPPED | OUTPATIENT
Start: 2023-05-10

## 2023-07-31 DIAGNOSIS — E78.5 DYSLIPIDEMIA: ICD-10-CM

## 2023-07-31 RX ORDER — LOVASTATIN 40 MG/1
TABLET ORAL
Qty: 90 TABLET | Refills: 0 | Status: SHIPPED | OUTPATIENT
Start: 2023-07-31

## 2023-09-01 ENCOUNTER — TELEPHONE (OUTPATIENT)
Dept: INTERNAL MEDICINE CLINIC | Facility: CLINIC | Age: 78
End: 2023-09-01

## 2023-09-01 DIAGNOSIS — Z13.228 SCREENING FOR METABOLIC DISORDER: ICD-10-CM

## 2023-09-01 DIAGNOSIS — Z13.0 SCREENING FOR BLOOD DISEASE: ICD-10-CM

## 2023-09-01 DIAGNOSIS — Z00.00 ROUTINE GENERAL MEDICAL EXAMINATION AT A HEALTH CARE FACILITY: Primary | ICD-10-CM

## 2023-09-01 DIAGNOSIS — Z13.29 SCREENING FOR THYROID DISORDER: ICD-10-CM

## 2023-09-01 DIAGNOSIS — Z12.5 ENCOUNTER FOR SPECIAL SCREENING EXAMINATION FOR NEOPLASM OF PROSTATE: ICD-10-CM

## 2023-09-01 DIAGNOSIS — Z13.220 SCREENING FOR LIPID DISORDERS: ICD-10-CM

## 2023-09-01 DIAGNOSIS — R73.9 BLOOD GLUCOSE ELEVATED: ICD-10-CM

## 2023-09-08 ENCOUNTER — APPOINTMENT (OUTPATIENT)
Dept: GENERAL RADIOLOGY | Facility: HOSPITAL | Age: 78
End: 2023-09-08
Attending: EMERGENCY MEDICINE
Payer: COMMERCIAL

## 2023-09-08 ENCOUNTER — HOSPITAL ENCOUNTER (EMERGENCY)
Facility: HOSPITAL | Age: 78
Discharge: HOME | End: 2023-09-08
Attending: EMERGENCY MEDICINE
Payer: COMMERCIAL

## 2023-09-08 VITALS
SYSTOLIC BLOOD PRESSURE: 147 MMHG | DIASTOLIC BLOOD PRESSURE: 78 MMHG | TEMPERATURE: 99 F | HEART RATE: 90 BPM | OXYGEN SATURATION: 96 % | BODY MASS INDEX: 27.28 KG/M2 | WEIGHT: 180 LBS | RESPIRATION RATE: 16 BRPM | HEIGHT: 68 IN

## 2023-09-08 DIAGNOSIS — M79.672 LEFT FOOT PAIN: Primary | ICD-10-CM

## 2023-09-08 PROCEDURE — 99284 EMERGENCY DEPT VISIT MOD MDM: CPT

## 2023-09-08 PROCEDURE — 99283 EMERGENCY DEPT VISIT LOW MDM: CPT

## 2023-09-08 PROCEDURE — 73630 X-RAY EXAM OF FOOT: CPT | Performed by: EMERGENCY MEDICINE

## 2023-09-08 NOTE — ED INITIAL ASSESSMENT (HPI)
Pt reports he hit left pinky toe against something on the floor on Tuesday, pain and swelling now in left foot.

## 2023-10-30 DIAGNOSIS — E78.5 DYSLIPIDEMIA: ICD-10-CM

## 2023-10-31 RX ORDER — LOVASTATIN 40 MG/1
TABLET ORAL
Qty: 90 TABLET | Refills: 0 | Status: SHIPPED | OUTPATIENT
Start: 2023-10-31

## 2023-12-05 LAB
ABSOLUTE BASOPHILS: 18 CELLS/UL (ref 0–200)
ABSOLUTE EOSINOPHILS: 60 CELLS/UL (ref 15–500)
ABSOLUTE LYMPHOCYTES: 1044 CELLS/UL (ref 850–3900)
ABSOLUTE MONOCYTES: 451 CELLS/UL (ref 200–950)
ABSOLUTE NEUTROPHILS: 3027 CELLS/UL (ref 1500–7800)
ALBUMIN/GLOBULIN RATIO: 1.5 (CALC) (ref 1–2.5)
ALBUMIN: 4.3 G/DL (ref 3.6–5.1)
ALKALINE PHOSPHATASE: 80 U/L (ref 35–144)
ALT: 19 U/L (ref 9–46)
AST: 19 U/L (ref 10–35)
BASOPHILS: 0.4 %
BILIRUBIN, TOTAL: 0.6 MG/DL (ref 0.2–1.2)
BUN: 19 MG/DL (ref 7–25)
CALCIUM: 9.4 MG/DL (ref 8.6–10.3)
CARBON DIOXIDE: 26 MMOL/L (ref 20–32)
CHLORIDE: 107 MMOL/L (ref 98–110)
CHOL/HDLC RATIO: 2.2 (CALC)
CHOLESTEROL, TOTAL: 137 MG/DL
CREATININE: 1.14 MG/DL (ref 0.7–1.28)
EGFR: 67 ML/MIN/1.73M2
EOSINOPHILS: 1.3 %
GLOBULIN: 2.8 G/DL (CALC) (ref 1.9–3.7)
GLUCOSE: 99 MG/DL (ref 65–99)
HDL CHOLESTEROL: 62 MG/DL
HEMATOCRIT: 44.5 % (ref 38.5–50)
HEMOGLOBIN A1C: 5.7 % OF TOTAL HGB
HEMOGLOBIN: 14.4 G/DL (ref 13.2–17.1)
LDL-CHOLESTEROL: 62 MG/DL (CALC)
LYMPHOCYTES: 22.7 %
MCH: 29.1 PG (ref 27–33)
MCHC: 32.4 G/DL (ref 32–36)
MCV: 89.9 FL (ref 80–100)
MONOCYTES: 9.8 %
MPV: 10.6 FL (ref 7.5–12.5)
NEUTROPHILS: 65.8 %
NON-HDL CHOLESTEROL: 75 MG/DL (CALC)
PLATELET COUNT: 203 THOUSAND/UL (ref 140–400)
POTASSIUM: 3.9 MMOL/L (ref 3.5–5.3)
PROTEIN, TOTAL: 7.1 G/DL (ref 6.1–8.1)
PSA, TOTAL: 0.97 NG/ML
RDW: 13.8 % (ref 11–15)
RED BLOOD CELL COUNT: 4.95 MILLION/UL (ref 4.2–5.8)
SODIUM: 141 MMOL/L (ref 135–146)
TRIGLYCERIDES: 46 MG/DL
TSH W/REFLEX TO FT4: 3.73 MIU/L (ref 0.4–4.5)
WHITE BLOOD CELL COUNT: 4.6 THOUSAND/UL (ref 3.8–10.8)

## 2023-12-16 ENCOUNTER — OFFICE VISIT (OUTPATIENT)
Dept: INTERNAL MEDICINE CLINIC | Facility: CLINIC | Age: 78
End: 2023-12-16
Payer: COMMERCIAL

## 2023-12-16 VITALS
TEMPERATURE: 98 F | DIASTOLIC BLOOD PRESSURE: 64 MMHG | OXYGEN SATURATION: 99 % | RESPIRATION RATE: 16 BRPM | HEART RATE: 90 BPM | HEIGHT: 67 IN | BODY MASS INDEX: 28.54 KG/M2 | WEIGHT: 181.81 LBS | SYSTOLIC BLOOD PRESSURE: 128 MMHG

## 2023-12-16 DIAGNOSIS — E78.5 DYSLIPIDEMIA: ICD-10-CM

## 2023-12-16 DIAGNOSIS — Z86.010 PERSONAL HISTORY OF COLONIC POLYPS: ICD-10-CM

## 2023-12-16 DIAGNOSIS — R35.1 BENIGN PROSTATIC HYPERPLASIA WITH NOCTURIA: ICD-10-CM

## 2023-12-16 DIAGNOSIS — Z00.00 ROUTINE GENERAL MEDICAL EXAMINATION AT A HEALTH CARE FACILITY: Primary | ICD-10-CM

## 2023-12-16 DIAGNOSIS — N40.1 BENIGN PROSTATIC HYPERPLASIA WITH NOCTURIA: ICD-10-CM

## 2023-12-16 PROCEDURE — 3008F BODY MASS INDEX DOCD: CPT | Performed by: INTERNAL MEDICINE

## 2023-12-16 PROCEDURE — 3078F DIAST BP <80 MM HG: CPT | Performed by: INTERNAL MEDICINE

## 2023-12-16 PROCEDURE — 3074F SYST BP LT 130 MM HG: CPT | Performed by: INTERNAL MEDICINE

## 2023-12-16 PROCEDURE — 99397 PER PM REEVAL EST PAT 65+ YR: CPT | Performed by: INTERNAL MEDICINE

## 2023-12-16 RX ORDER — TAMSULOSIN HYDROCHLORIDE 0.4 MG/1
0.4 CAPSULE ORAL DAILY
Qty: 90 CAPSULE | Refills: 3 | Status: SHIPPED | OUTPATIENT
Start: 2023-12-16 | End: 2024-12-10

## 2024-01-27 DIAGNOSIS — E78.5 DYSLIPIDEMIA: ICD-10-CM

## 2024-01-29 RX ORDER — LOVASTATIN 40 MG/1
TABLET ORAL
Qty: 90 TABLET | Refills: 0 | Status: SHIPPED | OUTPATIENT
Start: 2024-01-29

## 2024-04-25 NOTE — TELEPHONE ENCOUNTER
Fasting lab orders placed for [facility] lab per protocol
Future Appointments   Date Time Provider Yulisa Chewi   11/23/2020  8:00 AM Damián Sinha MD [facility]     Annual Physical   Lab is [facility]  Pt aware to fast and to complete labs no sooner than 2 weeks prior to physical   No call back requi
Patent

## 2024-04-26 DIAGNOSIS — E78.5 DYSLIPIDEMIA: ICD-10-CM

## 2024-04-26 RX ORDER — LOVASTATIN 40 MG/1
TABLET ORAL
Qty: 90 TABLET | Refills: 0 | Status: SHIPPED | OUTPATIENT
Start: 2024-04-26

## 2024-07-25 DIAGNOSIS — E78.5 DYSLIPIDEMIA: ICD-10-CM

## 2024-07-29 RX ORDER — LOVASTATIN 40 MG/1
40 TABLET ORAL NIGHTLY
Qty: 90 TABLET | Refills: 3 | Status: SHIPPED | OUTPATIENT
Start: 2024-07-29

## 2024-07-29 NOTE — TELEPHONE ENCOUNTER
Refill Per Protocol     Requested Prescriptions   Pending Prescriptions Disp Refills    LOVASTATIN 40 MG Oral Tab [Pharmacy Med Name: LOVASTATIN TAB 40MG] 90 tablet 0     Sig: TAKE 1 TABLET NIGHTLY       Cholesterol Medication Protocol Passed - 7/25/2024  1:18 AM        Passed - ALT < 80     Lab Results   Component Value Date    ALT 19 12/04/2023             Passed - ALT resulted within past year        Passed - Lipid panel within past 12 months     Lab Results   Component Value Date    CHOLEST 137 12/04/2023    TRIG 46 12/04/2023    HDL 62 12/04/2023    LDL 62 12/04/2023    VLDL 8 08/14/2014    TCHDLRATIO 2.2 12/04/2023    NONHDLC 75 12/04/2023             Passed - In person appointment or virtual visit in the past 12 mos or appointment in next 3 mos     Recent Outpatient Visits              7 months ago Routine general medical examination at a health care facility    [facility], [address] [place] [address] July Dunbar MD    Office Visit    1 year ago Bleeding from the nose    [facility], [facility] Zeynep Olson PA-C    Office Visit    1 year ago Routine general medical examination at a health care facility    [address] July Dunbar MD    Office Visit    2 years ago Right-sided epistaxis    [facility] Marina Cole APRN    Office Visit    2 years ago Routine general medical examination [address] health care facility    [address]    Office Visit                               Recent Outpatient Visits              7 months ago Routine general medical examination at a health care facility    [address]    Office Visit    1 year ago Bleeding from the nose    [facility],  [facility] Zeynep Olson PA-C    Office Visit    1 year ago Routine general medical examination at a health care facility    [facility], [address] [place] [address], July Dunbar MD    Office Visit    2 years ago Right-sided epistaxis    [facility], [facility] Marina Cole APRN    Office Visit    2 years ago Routine general medical examination at a health care facility    [facility], [address] [place] [address], July Dunbar MD    Office Visit

## 2024-11-15 DIAGNOSIS — Z00.00 ROUTINE GENERAL MEDICAL EXAMINATION AT A HEALTH CARE FACILITY: Primary | ICD-10-CM

## 2024-11-15 DIAGNOSIS — Z13.29 SCREENING FOR THYROID DISORDER: ICD-10-CM

## 2024-11-15 DIAGNOSIS — Z13.228 SCREENING FOR METABOLIC DISORDER: ICD-10-CM

## 2024-11-15 DIAGNOSIS — Z13.0 SCREENING FOR BLOOD DISEASE: ICD-10-CM

## 2024-11-15 DIAGNOSIS — Z13.220 SCREENING FOR LIPID DISORDERS: ICD-10-CM

## 2025-02-05 LAB
ABSOLUTE BASOPHILS: 10 CELLS/UL (ref 0–200)
ABSOLUTE EOSINOPHILS: 120 CELLS/UL (ref 15–500)
ABSOLUTE LYMPHOCYTES: 1020 CELLS/UL (ref 850–3900)
ABSOLUTE MONOCYTES: 475 CELLS/UL (ref 200–950)
ABSOLUTE NEUTROPHILS: 3375 CELLS/UL (ref 1500–7800)
ALBUMIN/GLOBULIN RATIO: 1.5 (CALC) (ref 1–2.5)
ALBUMIN: 4.4 G/DL (ref 3.6–5.1)
ALKALINE PHOSPHATASE: 90 U/L (ref 35–144)
ALT: 23 U/L (ref 9–46)
AST: 21 U/L (ref 10–35)
BASOPHILS: 0.2 %
BILIRUBIN, TOTAL: 0.7 MG/DL (ref 0.2–1.2)
BUN: 20 MG/DL (ref 7–25)
CALCIUM: 9.8 MG/DL (ref 8.6–10.3)
CARBON DIOXIDE: 28 MMOL/L (ref 20–32)
CHLORIDE: 103 MMOL/L (ref 98–110)
CHOL/HDLC RATIO: 2.4 (CALC)
CHOLESTEROL, TOTAL: 156 MG/DL
CREATININE: 1.16 MG/DL (ref 0.7–1.28)
EGFR: 65 ML/MIN/1.73M2
EOSINOPHILS: 2.4 %
GLOBULIN: 2.9 G/DL (CALC) (ref 1.9–3.7)
GLUCOSE: 110 MG/DL (ref 65–99)
HDL CHOLESTEROL: 66 MG/DL
HEMATOCRIT: 47.2 % (ref 38.5–50)
HEMOGLOBIN: 14.9 G/DL (ref 13.2–17.1)
LDL-CHOLESTEROL: 77 MG/DL (CALC)
LYMPHOCYTES: 20.4 %
MCH: 30 PG (ref 27–33)
MCHC: 31.6 G/DL (ref 32–36)
MCV: 95 FL (ref 80–100)
MONOCYTES: 9.5 %
MPV: 10.5 FL (ref 7.5–12.5)
NEUTROPHILS: 67.5 %
NON-HDL CHOLESTEROL: 90 MG/DL (CALC)
PLATELET COUNT: 189 THOUSAND/UL (ref 140–400)
POTASSIUM: 4.6 MMOL/L (ref 3.5–5.3)
PROTEIN, TOTAL: 7.3 G/DL (ref 6.1–8.1)
RDW: 13.1 % (ref 11–15)
RED BLOOD CELL COUNT: 4.97 MILLION/UL (ref 4.2–5.8)
SODIUM: 140 MMOL/L (ref 135–146)
TRIGLYCERIDES: 57 MG/DL
TSH W/REFLEX TO FT4: 4.04 MIU/L (ref 0.4–4.5)
WHITE BLOOD CELL COUNT: 5 THOUSAND/UL (ref 3.8–10.8)

## 2025-02-18 ENCOUNTER — OFFICE VISIT (OUTPATIENT)
Dept: INTERNAL MEDICINE CLINIC | Facility: CLINIC | Age: 80
End: 2025-02-18
Payer: COMMERCIAL

## 2025-02-18 VITALS
WEIGHT: 183 LBS | HEIGHT: 67.32 IN | DIASTOLIC BLOOD PRESSURE: 60 MMHG | BODY MASS INDEX: 28.39 KG/M2 | RESPIRATION RATE: 16 BRPM | SYSTOLIC BLOOD PRESSURE: 122 MMHG | TEMPERATURE: 98 F | OXYGEN SATURATION: 97 % | HEART RATE: 80 BPM

## 2025-02-18 DIAGNOSIS — N40.1 BPH ASSOCIATED WITH NOCTURIA: ICD-10-CM

## 2025-02-18 DIAGNOSIS — Z00.00 ROUTINE GENERAL MEDICAL EXAMINATION AT A HEALTH CARE FACILITY: Primary | ICD-10-CM

## 2025-02-18 DIAGNOSIS — R35.1 BPH ASSOCIATED WITH NOCTURIA: ICD-10-CM

## 2025-02-18 DIAGNOSIS — E78.5 DYSLIPIDEMIA: ICD-10-CM

## 2025-02-18 DIAGNOSIS — R73.9 BLOOD GLUCOSE ELEVATED: ICD-10-CM

## 2025-02-18 DIAGNOSIS — N39.41 URGE INCONTINENCE: ICD-10-CM

## 2025-02-18 LAB
ATRIAL RATE: 81 BPM
P AXIS: -6 DEGREES
P-R INTERVAL: 148 MS
Q-T INTERVAL: 362 MS
QRS DURATION: 86 MS
QTC CALCULATION (BEZET): 420 MS
R AXIS: -43 DEGREES
T AXIS: 56 DEGREES
VENTRICULAR RATE: 81 BPM

## 2025-02-18 RX ORDER — LOVASTATIN 40 MG/1
40 TABLET ORAL NIGHTLY
Qty: 90 TABLET | Refills: 3 | Status: SHIPPED | OUTPATIENT
Start: 2025-02-18

## 2025-02-18 NOTE — PROGRESS NOTES
Chief Complaint   Patient presents with    Wellness Visit     RM- 3 , AW , WELLNESS        HPI:    Patient here for CPE  Working full time at [employer], enjoys working.  CPE labs ok except . He is cutting back on sugar  BPH with nocturia times , tried flomax without improvement. Recently having urge incontinence in the mornings sometimes.  No dysuria/hematuria. He does not currently have a urologist.  HL- compliant with lovastatin, lipids controlled. No CP/palp/SOB. EKG unchanged from prior       Review of Systems   Constitutional: Negative for fever  HENT: Negative for hearing loss, congestion  Eyes: Negative for pain and visual disturbance.   Respiratory: Negative for cough, chest tightness  Cardiovascular: Negative for chest pain, palpitations and leg swelling.   Gastrointestinal: Negative for nausea, vomiting  Genitourinary: Negative for dysuria, hematuria   Skin: Negative for color change and rash.   Neurological: Negative for dizziness, syncope  Hematological: Negative for adenopathy.   Psychiatric/Behavioral: No depression.    Patient Active Problem List   Diagnosis    Other and unspecified hyperlipidemia    Dyslipidemia    Internal hemorrhoids    Benign prostatic hyperplasia    Constipation    Glaucoma    Personal history of colonic polyps    Family history of malignant neoplasm of digestive organ    Benign neoplasm of transverse colon    Benign neoplasm of sigmoid colon    History of adenomatous polyp of colon    Family history of colon cancer    Age-related nuclear cataract of both eyes       Past Medical History:    Body aches    BPH (benign prostatic hypertrophy)    Chills    Diverticulitis of colon    diverticuli observed in colon    Dyslipidemia    Hypercholesterolemia    Glaucoma    Hemorrhage of eyelid    \"right eye bleed\"    Herpes zoster without mention of complication    High cholesterol    controlled by lovastain    Internal hemorrhoids    Joint pain    Ocular motility disorder    Other  and unspecified hyperlipidemia    Other vitreous opacities    Rash    Reflux    Symptoms    Routine general medical examination at a health care facility    Tinea    infection    Wears glasses    wear glasses     Past Surgical History:   Procedure Laterality Date    Colonoscopy  2005, 2/23/16    Colonoscopy  2-23-16    Other surgical history  2004    \"bleed rt eye (no retinal detachment)\"     Family History   Problem Relation Age of Onset    Cancer Father         colon    Colon Cancer Father         age 74    Other (Dementia [Other]) Mother         Alzheimer's    High Cholesterol Sister      Social History     Socioeconomic History    Marital status: Single   Occupational History    Occupation: Senior    Tobacco Use    Smoking status: Never     Passive exposure: Never    Smokeless tobacco: Never   Vaping Use    Vaping status: Never Used   Substance and Sexual Activity    Alcohol use: Yes     Alcohol/week: 4.0 standard drinks of alcohol     Types: 4 Cans of beer per week     Comment: 3-4 12 oz. servings per week cage 2/18/25    Drug use: Never    Sexual activity: Not Currently   Other Topics Concern    Caffeine Concern Yes     Comment: 3 a day    Stress Concern No    Weight Concern No    Special Diet No    Exercise Yes     Comment: no regular regimen    Seat Belt Yes       Current Outpatient Medications   Medication Sig Dispense Refill    Lovastatin 40 MG Oral Tab Take 1 tablet (40 mg total) by mouth nightly. 90 tablet 3    PREVIDENT 5000 BOOSTER PLUS 1.1 % Dental Paste Take 100 mL by mouth as needed. Uses for dentist only      latanoprost 0.005 % Ophthalmic Solution PLACE 1 DROP INTO BOTH EYES EVERY NIGHT AT BEDTIME.  6       Allergies  Allergies[1]    Health Maintenance  Immunizations:  Immunization History   Administered Date(s) Administered    Covid-19 Vaccine Moderna 100 mcg/0.5 ml 03/17/2021, 04/14/2021    Covid-19 Vaccine Moderna 50 Mcg/0.25 Ml 12/21/2021    FLU VAC High Dose 65 YRS & Older  PRSV Free (11673) 09/12/2016, 09/12/2018, 09/11/2019, 09/28/2021, 09/28/2022    FLUZONE 6 months and older PFS 0.5 ml (30275) 09/11/2016    Fluzone Vaccine Medicare () 09/10/2017, 09/11/2018, 09/11/2019    Influenza 10/01/2009, 10/24/2009, 10/21/2013, 10/30/2014, 10/22/2015, 09/11/2017, 09/08/2020    Pneumococcal (Prevnar 13) 11/16/2015, 09/11/2019    Pneumovax 23 10/15/2012, 04/26/2022    TDAP 11/16/2015    Zoster Vaccine Recombinant Adjuvanted (Shingrix) 01/05/2020, 05/12/2021         Physical Exam  /60 (BP Location: Right arm, Patient Position: Sitting, Cuff Size: adult)   Pulse 80   Temp 97.5 °F (36.4 °C) (Temporal)   Resp 16   Ht 5' 7.32\" (1.71 m)   Wt 183 lb (83 kg)   SpO2 97%   BMI 28.39 kg/m²   Constitutional: Oriented to person, place, and time. No distress.   HEENT:  Normocephalic and atraumatic. Hearing and tympanic membranes normal.   Eyes: Conjunctivae and EOM are normal. PERRLA. No scleral icterus.   Neck: Normal range of motion. Neck supple.   Cardiovascular: Normal rate, regular rhythm and intact distal pulses.    Pulmonary/Chest: Effort normal and breath sounds normal.   Abdominal: Soft. Bowel sounds are normal. Non tender, ND  Digital rectal exam - mild enlargement of prostate without nodules   Neurological: No cranial nerve deficit or sensory deficit. Normal muscle tone. Coordination normal.   Skin: Skin is warm and dry.   Psychiatric: Normal mood and affect.     EKG- NSR 81 bpm, LAD, abnormal EKG (no change since 11/2017), he did stress echo 12/2017 and WNL  A/P:    Encounter Diagnoses   Name                Routine general medical examination at a health care facility- he had flu vaccine and covid 19 booster already from outside pharmacy, he is up to date on colonoscopy.      Dyslipidemia- controlled, CPM. EKG done today is unchanged from previous     Elevated blood glucose- watch diet,  monitor yearly       Urge incontinence - avoid caffeine and sugary drinks, referred to  urology    BPH associated with nocturia- tried flomax without improvement, referred to urology       No orders of the defined types were placed in this encounter.      Meds & Refills for this Visit:  Requested Prescriptions     Signed Prescriptions Disp Refills    Lovastatin 40 MG Oral Tab 90 tablet 3     Sig: Take 1 tablet (40 mg total) by mouth nightly.       Imaging & Consults:  UROLOGY - INTERNAL  ELECTROCARDIOGRAM, COMPLETE      Return in about 1 year (around 2/18/2026), or if symptoms worsen or fail to improve, for annual visit.    There are no Patient Instructions on file for this visit.    All questions were answered and the patient understands the plan.                [1] No Known Allergies

## (undated) DIAGNOSIS — E78.5 DYSLIPIDEMIA: ICD-10-CM

## (undated) NOTE — LETTER
11/28/17        [address] [address]      Dear Arcenio Bell records indicate that you have outstanding lab work and or testing that was ordered for you and has not yet been completed:    TSH and Free T4 [E]        To p

## (undated) NOTE — LETTER
03/24/21        [address] [address]      Dear Trinity Bernard records indicate that you have outstanding lab work and or testing that was ordered for you and has not yet been completed:  Orders Placed This Encounter